# Patient Record
Sex: FEMALE | Race: WHITE | NOT HISPANIC OR LATINO | Employment: UNEMPLOYED | URBAN - METROPOLITAN AREA
[De-identification: names, ages, dates, MRNs, and addresses within clinical notes are randomized per-mention and may not be internally consistent; named-entity substitution may affect disease eponyms.]

---

## 2017-03-17 ENCOUNTER — OFFICE VISIT (OUTPATIENT)
Dept: ENDOCRINOLOGY | Age: 32
End: 2017-03-17

## 2017-03-17 VITALS
DIASTOLIC BLOOD PRESSURE: 72 MMHG | HEIGHT: 69 IN | BODY MASS INDEX: 19.99 KG/M2 | OXYGEN SATURATION: 98 % | SYSTOLIC BLOOD PRESSURE: 112 MMHG | WEIGHT: 135 LBS | HEART RATE: 97 BPM

## 2017-03-17 DIAGNOSIS — E05.00 GRAVES DISEASE: Primary | ICD-10-CM

## 2017-03-17 DIAGNOSIS — L70.9 ACNE, UNSPECIFIED ACNE TYPE: ICD-10-CM

## 2017-03-17 DIAGNOSIS — L68.0 HIRSUTISM: ICD-10-CM

## 2017-03-17 DIAGNOSIS — R53.82 CHRONIC FATIGUE: ICD-10-CM

## 2017-03-17 DIAGNOSIS — E28.2 PCOS (POLYCYSTIC OVARIAN SYNDROME): ICD-10-CM

## 2017-03-17 PROCEDURE — 99214 OFFICE O/P EST MOD 30 MIN: CPT | Performed by: INTERNAL MEDICINE

## 2017-03-17 RX ORDER — ALPRAZOLAM 0.5 MG/1
TABLET ORAL
COMMUNITY
Start: 2017-02-27

## 2017-03-17 RX ORDER — GABAPENTIN 300 MG/1
CAPSULE ORAL
COMMUNITY
Start: 2016-01-07 | End: 2017-11-09

## 2017-03-17 RX ORDER — HYDROCODONE BITARTRATE AND ACETAMINOPHEN 7.5; 325 MG/1; MG/1
TABLET ORAL
COMMUNITY
Start: 2016-01-07 | End: 2017-11-09

## 2017-03-17 RX ORDER — DEXTROAMPHETAMINE SULFATE, DEXTROAMPHETAMINE SACCHARATE, AMPHETAMINE SULFATE AND AMPHETAMINE ASPARTATE 6.25; 6.25; 6.25; 6.25 MG/1; MG/1; MG/1; MG/1
CAPSULE, EXTENDED RELEASE ORAL
COMMUNITY
Start: 2017-01-04 | End: 2017-11-09 | Stop reason: DRUGHIGH

## 2017-03-17 RX ORDER — FLUCONAZOLE 150 MG/1
TABLET ORAL
COMMUNITY
Start: 2017-02-26 | End: 2017-11-09

## 2017-03-17 NOTE — PROGRESS NOTES
31 y.o.    Patient Care Team:  Jennifer Ledesma MD as PCP - General  Jennifer Ledesma MD as PCP - Family Medicine    Chief Complaint:        F/U GRAVES' DISEASE.  NO RECENT LABS.  Subjective     HPI   Patient is a 31-year-old white female with a past history of Graves' disease and hyperthyroidism came for followup. After one year.  Patient missed a few appointments.  Hyperthyroidism. And Graves' disease.  Patient was previously evaluated by endocrinologist, but never needed medication. So far  Patient is currently not on medication.  She reports symptoms of fatigue, excessive somnolence, rash or the upper chest and face, palpitations, worsening acne, facial hair issues, blurring of vision for the past 6 months.  PCO S.  Patient reported that she used to be on spironolactone in the past but apparently did not tolerate it. So it was discontinued.  Menorrhagia.  Patient also reports excessive bleeding with clots and irregular bleeding for the past several months. She reports nausea and dizziness      Interval History     Summary  Hyperthyroidism and Graves' disease  Patient reports that she was diagnosed with Graves' disease approximately 6 years ago when her primary care physician is evaluating her for hypoglycemic episodes. He subsequently sent her to Dr. Piper who did most of the testing including a radioiodine scan and confirmed that she has Graves' disease but apparently patient was not recommended any medication at that time. She has not been on any specific medication since then.     Patient reports symptoms of joint pains, extreme fatigue, insomnia, heat intolerance, sweating but no change in weight. She also reports swelling of her hands frequently.  Patient also reports loose stools for the past few years.     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 Apparently she has seen a  neurosurgeon and pain management specialist and is currently on pain medication including Norco, gabapentin, Xanax etc.     Blurring of vision  Patient also reports significant blurring of vision especially on maintaining any focus for any length of time. She denies water from the eyes. She denies double vision or pain in the eyeball. She is extremely afraid of graves ophthalmopathy     Patient denied any family history of thyroid disorders.  She currently sees a gynecologist or a nurse practitioner and periodically receives Estrogen or testosterone products, she last used the pills approximately one month ago        The following portions of the patient's history were reviewed and updated as appropriate: allergies, current medications, past family history, past medical history, past social history, past surgical history and problem list.    No past medical history on file.  No family history on file.  Social History     Social History   • Marital status:      Spouse name: N/A   • Number of children: N/A   • Years of education: N/A     Occupational History   • Not on file.     Social History Main Topics   • Smoking status: Not on file   • Smokeless tobacco: Not on file   • Alcohol use Not on file   • Drug use: Not on file   • Sexual activity: Not on file     Other Topics Concern   • Not on file     Social History Narrative     Allergies not on file  No current outpatient prescriptions on file.        Review of Systems   Constitutional: Positive for fatigue. Negative for chills and fever.   Cardiovascular: Negative for chest pain and palpitations.   Gastrointestinal: Positive for constipation. Negative for abdominal pain, diarrhea, nausea and vomiting.   Endocrine: Positive for cold intolerance and heat intolerance.   Skin: Positive for rash (CHEST AND FACE).   All other systems reviewed and are negative.      Objective       Vitals:    03/17/17 0948   BP: 112/72   Pulse: 97   SpO2: 98%   Weight: 135 lb (61.2  "kg)   Height: 69\" (175.3 cm)     Body mass index is 19.94 kg/(m^2).      Physical Exam   Constitutional: She is oriented to person, place, and time.   HENT:   Head: Normocephalic and atraumatic.   Eyes: EOM are normal. Pupils are equal, round, and reactive to light.   Neck: Normal range of motion. Neck supple. No thyromegaly present.   Cardiovascular: Regular rhythm, normal heart sounds and intact distal pulses.    Pulmonary/Chest: Effort normal and breath sounds normal.   Abdominal: Soft. Bowel sounds are normal. She exhibits no distension. There is no tenderness.   Musculoskeletal: Normal range of motion. She exhibits no edema.   Neurological: She is alert and oriented to person, place, and time.   Skin: Skin is warm and dry.   Psychiatric: She has a normal mood and affect. Her behavior is normal.   Nursing note and vitals reviewed.    Results Review:     I reviewed the patient's new clinical results.    Medical records reviewed  Summary:      Conversion Encounter on 01/07/2016   Component Date Value Ref Range Status   • Free T4 01/07/2016 1.39  0.82 - 1.77 ng/dL Final   • TSH 01/07/2016 0.512  0.450 - 4.500 uIU/mL Final   • 25 Hydroxy, Vitamin D 01/07/2016 38.5  30.0 - 100.0 ng/mL Final    Comment: Vitamin D deficiency has been defined by the [place] [employer] and an Endocrine Society practice guideline as a  level of serum 25-OH vitamin D less than 20 ng/mL (1,2).  The Endocrine Society went on to further define vitamin D  insufficiency as a level between 21 and 29 ng/mL (2).  1. IOM ([place] [employer]). 2010. Dietary reference     intakes for calcium and D. [place]: The     National Academies Press.  2. Marisol MF, Belinda NC, Odalis MORELOS, et al.     Evaluation, treatment, and prevention of vitamin D     deficiency: an Endocrine Society clinical practice     guideline. JCEM. 2011 Jul; 96(7):1911-30.     • Thyroid Stimulating Immunoglobulin 01/07/2016 42  0 - 139 % Final   • T3, Total " 01/07/2016 106  71 - 180 ng/dL Final   • Vitamin B-12 01/07/2016 302  211 - 946 pg/mL Final   • Thyroid Peroxidase Antibody 01/07/2016 7  0 - 34 IU/mL Final     No results found for: HGBA1C  No results found for: GLUF, MICROALBUR, LDLCALC, CREATININE  Imaging Results (most recent)     None                Assessment and Plan:    Abigail was seen today for graves' disease.    Diagnoses and all orders for this visit:    Graves disease    Acne, unspecified acne type  -     Comprehensive Metabolic Panel  -     T3  -     T4, Free  -     TSH  -     Thyroid Peroxidase Antibody  -     Thyroid Stimulating Immunoglobulin    Hirsutism  -     Comprehensive Metabolic Panel  -     T3  -     T4, Free  -     TSH  -     Thyroid Peroxidase Antibody  -     Thyroid Stimulating Immunoglobulin    PCOS (polycystic ovarian syndrome)  -     Comprehensive Metabolic Panel  -     T3  -     T4, Free  -     TSH  -     Thyroid Peroxidase Antibody  -     Thyroid Stimulating Immunoglobulin    Chronic fatigue    Other orders  -     metFORMIN (GLUCOPHAGE) 500 MG tablet; Take 1 tablet by mouth 2 (Two) Times a Day With Meals.      Patient had history of Graves' disease but the last check was completely normal approximately one year ago  Patient continues to complain of symptoms suggestive of hyperthyroid state  She will get lab testing done today  She will try metformin  250 mg starting dose and adjust as tolerated for her PCO S and acne and hirsutism  Patient reported that she has tried spironolactone but had a lot of side effects in the past  Patient will get lab testing done today  She'll be notified of the lab results    Thyroid ultrasound reviewed with the patient  Mildly enlarged goiter no nodules noted  If indicating I may ask her to get radioactive iodine uptake and scan this time    Patient will return to follow-up in 6 months  The total time spent for old record and lab review and face- to- face was more than 25 min of which greater than 50% of  time was spent on counseling the patient on recommended evaluation and treatment options, instructions for management/treatment and /or follow up  and importance of compliance with chosen management or treatment options    Maynor Michaels MD. FACE    03/17/17      EMR Dragon / transcription disclaimer:    Much of this encounter note is an electronic transcription/ translation of spoken language to printed text.  Electronic translation of spoken language may permit erroneous, or at times, nonsensical words or phrases to be inadvertently transcribed; although I have reviewed the note for such errors, some may still exist.

## 2017-03-23 LAB
ALBUMIN SERPL-MCNC: 4.7 G/DL (ref 3.5–5.2)
ALBUMIN/GLOB SERPL: 1.9 G/DL
ALP SERPL-CCNC: 35 U/L (ref 39–117)
ALT SERPL-CCNC: 14 U/L (ref 1–33)
AST SERPL-CCNC: 18 U/L (ref 1–32)
BILIRUB SERPL-MCNC: 0.5 MG/DL (ref 0.1–1.2)
BUN SERPL-MCNC: 10 MG/DL (ref 6–20)
BUN/CREAT SERPL: 13.2 (ref 7–25)
CALCIUM SERPL-MCNC: 9.7 MG/DL (ref 8.6–10.5)
CHLORIDE SERPL-SCNC: 101 MMOL/L (ref 98–107)
CO2 SERPL-SCNC: 26.6 MMOL/L (ref 22–29)
CREAT SERPL-MCNC: 0.76 MG/DL (ref 0.57–1)
GLOBULIN SER CALC-MCNC: 2.5 GM/DL
GLUCOSE SERPL-MCNC: 103 MG/DL (ref 65–99)
POTASSIUM SERPL-SCNC: 4.9 MMOL/L (ref 3.5–5.2)
PROT SERPL-MCNC: 7.2 G/DL (ref 6–8.5)
SODIUM SERPL-SCNC: 141 MMOL/L (ref 136–145)
T3 SERPL-MCNC: 112.5 NG/DL (ref 80–200)
T4 FREE SERPL-MCNC: 1.18 NG/DL (ref 0.93–1.7)
THYROPEROXIDASE AB SERPL-ACNC: 8 IU/ML (ref 0–34)
TSH SERPL DL<=0.005 MIU/L-ACNC: 0.94 MIU/ML (ref 0.27–4.2)
TSI ACT/NOR SER: 30 % (ref 0–139)

## 2017-08-31 DIAGNOSIS — E05.00 GRAVES DISEASE: Primary | ICD-10-CM

## 2017-08-31 DIAGNOSIS — R53.82 CHRONIC FATIGUE: ICD-10-CM

## 2017-09-05 ENCOUNTER — RESULTS ENCOUNTER (OUTPATIENT)
Dept: ENDOCRINOLOGY | Age: 32
End: 2017-09-05

## 2017-09-05 DIAGNOSIS — R53.82 CHRONIC FATIGUE: ICD-10-CM

## 2017-09-05 DIAGNOSIS — E05.00 GRAVES DISEASE: ICD-10-CM

## 2017-11-09 ENCOUNTER — OFFICE VISIT (OUTPATIENT)
Dept: ENDOCRINOLOGY | Age: 32
End: 2017-11-09

## 2017-11-09 VITALS
BODY MASS INDEX: 19.76 KG/M2 | HEIGHT: 69 IN | HEART RATE: 86 BPM | DIASTOLIC BLOOD PRESSURE: 82 MMHG | SYSTOLIC BLOOD PRESSURE: 122 MMHG | WEIGHT: 133.4 LBS

## 2017-11-09 DIAGNOSIS — E16.1 HYPERINSULINISM: ICD-10-CM

## 2017-11-09 DIAGNOSIS — E05.00 GRAVES DISEASE: Primary | ICD-10-CM

## 2017-11-09 DIAGNOSIS — L70.9 ACNE, UNSPECIFIED ACNE TYPE: ICD-10-CM

## 2017-11-09 DIAGNOSIS — L68.0 HIRSUTISM: ICD-10-CM

## 2017-11-09 DIAGNOSIS — E28.2 PCOS (POLYCYSTIC OVARIAN SYNDROME): ICD-10-CM

## 2017-11-09 DIAGNOSIS — R53.82 CHRONIC FATIGUE: ICD-10-CM

## 2017-11-09 PROCEDURE — 99214 OFFICE O/P EST MOD 30 MIN: CPT | Performed by: INTERNAL MEDICINE

## 2017-11-09 RX ORDER — DEXTROAMPHETAMINE SULFATE, DEXTROAMPHETAMINE SACCHARATE, AMPHETAMINE SULFATE AND AMPHETAMINE ASPARTATE 7.5; 7.5; 7.5; 7.5 MG/1; MG/1; MG/1; MG/1
CAPSULE, EXTENDED RELEASE ORAL
COMMUNITY
Start: 2017-10-20

## 2017-11-09 NOTE — PROGRESS NOTES
32 y.o.    Patient Care Team:  Jennifer Ledesma MD as PCP - General  Jennifer Ledesma MD as PCP - Family Medicine    Chief Complaint:      F/U GRAVES' DISEASE.  NO RECENT LABS.  Subjective     HPI   Patient is a 31-year-old white female with a past history of Graves' disease and hyperthyroidism came for follow-up  Hypothyroidism  Patient was previously evaluated by endocrinologist but was never needed medication.  She's currently not taking any medication for the hyperthyroidism status  Graves' disease  Patient was diagnosed with Graves' disease  She denies any rash.  No pretibial myxedema and  No diplopia or blurring of vision.  Fatigue  Patient reports constant fatigue over the past several years.  She also has symptoms of palpitations and worsening acne, facial hair issues  PCO S  Patient is currently on hormone replacement from her gynecologist/practitioner.  Patient's last radioactive iodine uptake and scan was nearly 8 years ago  Patient also reports symptoms of hypoglycemia intermittently.    Menorrhagia.  Patient also reports excessive bleeding with clots and irregular bleeding for the past several months. She reports nausea and dizziness        Interval History     Summary  Hyperthyroidism and Graves' disease  Patient reports that she was diagnosed with Graves' disease approximately 6 years ago when her primary care physician is evaluating her for hypoglycemic episodes. He subsequently sent her to Dr. Piper who did most of the testing including a radioiodine scan and confirmed that she has Graves' disease but apparently patient was not recommended any medication at that time. She has not been on any specific medication since then.      Patient reports symptoms of joint pains, extreme fatigue, insomnia, heat intolerance, sweating but no change in weight. She also reports swelling of her hands frequently.  Patient also reports loose stools for the past few years.      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 Apparently she has seen a neurosurgeon and pain management specialist and is currently on pain medication including Norco, gabapentin, Xanax etc.      Blurring of vision  Patient also reports significant blurring of vision especially on maintaining any focus for any length of time. She denies water from the eyes. She denies double vision or pain in the eyeball. She is extremely afraid of graves ophthalmopathy      Patient denied any family history of thyroid disorders.  She currently sees a gynecologist or a nurse practitioner and periodically receives Estrogen or testosterone products, she last used the pills approximately one month ago  The following portions of the patient's history were reviewed and updated as appropriate: allergies, current medications, past family history, past medical history, past social history, past surgical history and problem list.    Past Medical History:   Diagnosis Date   • Graves disease    • PCOS (polycystic ovarian syndrome)      Family History   Problem Relation Age of Onset   • Hypertension Mother    • Diabetes Mother    • Hyperlipidemia Mother    • COPD Father    • Peripheral vascular disease Father    • Diabetes Father    • Hypertension Father    • Hyperlipidemia Father      Social History     Social History   • Marital status:      Spouse name: N/A   • Number of children: N/A   • Years of education: N/A     Occupational History   • Not on file.     Social History Main Topics   • Smoking status: Never Smoker   • Smokeless tobacco: Never Used   • Alcohol use No   • Drug use: No   • Sexual activity: Not on file     Other Topics Concern   • Not on file     Social History Narrative     Allergies   Allergen Reactions   • Epinephrine    • Lidocaine      Lidocaine with epi  Lidocaine with epi  Lidocaine with epi       Current Outpatient Prescriptions:  "  •  ADDERALL XR 25 MG 24 hr capsule, , Disp: , Rfl:   •  ALPRAZolam (XANAX) 0.5 MG tablet, , Disp: , Rfl:   •  fluconazole (DIFLUCAN) 150 MG tablet, , Disp: , Rfl:   •  gabapentin (NEURONTIN) 300 MG capsule, Take  by mouth., Disp: , Rfl:   •  HYDROcodone-acetaminophen (NORCO) 7.5-325 MG per tablet, Take  by mouth., Disp: , Rfl:   •  metFORMIN (GLUCOPHAGE) 500 MG tablet, Take 1 tablet by mouth 2 (Two) Times a Day With Meals., Disp: 60 tablet, Rfl: 3        Review of Systems   Constitutional: Positive for fever. Negative for chills and fatigue.   Cardiovascular: Negative for chest pain and palpitations.   Gastrointestinal: Positive for diarrhea. Negative for abdominal pain and constipation.   Endocrine: Negative for cold intolerance and heat intolerance.   All other systems reviewed and are negative.      Objective       Vitals:    11/09/17 1316   BP: 122/82   Pulse: 86   Weight: 133 lb 6.4 oz (60.5 kg)   Height: 69\" (175.3 cm)     Body mass index is 19.7 kg/(m^2).      Physical Exam   Constitutional: She is oriented to person, place, and time.   HENT:   Head: Normocephalic and atraumatic.   Eyes: EOM are normal. Pupils are equal, round, and reactive to light.   Neck: Normal range of motion. Neck supple. No thyromegaly present.   Cardiovascular: Regular rhythm, normal heart sounds and intact distal pulses.    Pulmonary/Chest: Effort normal and breath sounds normal.   Abdominal: Soft. Bowel sounds are normal. She exhibits no distension. There is no tenderness.   Musculoskeletal: Normal range of motion. She exhibits no edema.   Neurological: She is alert and oriented to person, place, and time.   Skin: Skin is warm and dry.   Psychiatric: She has a normal mood and affect. Her behavior is normal.   Nursing note and vitals reviewed.    Results Review:     I reviewed the patient's new clinical results.    Medical records reviewed  Summary:      Office Visit on 03/17/2017   Component Date Value Ref Range Status   • Glucose " 03/17/2017 103* 65 - 99 mg/dL Final   • BUN 03/17/2017 10  6 - 20 mg/dL Final   • Creatinine 03/17/2017 0.76  0.57 - 1.00 mg/dL Final   • eGFR Non  Am 03/17/2017 88  >60 mL/min/1.73 Final   • eGFR African Am 03/17/2017 107  >60 mL/min/1.73 Final   • BUN/Creatinine Ratio 03/17/2017 13.2  7.0 - 25.0 Final   • Sodium 03/17/2017 141  136 - 145 mmol/L Final   • Potassium 03/17/2017 4.9  3.5 - 5.2 mmol/L Final   • Chloride 03/17/2017 101  98 - 107 mmol/L Final   • Total CO2 03/17/2017 26.6  22.0 - 29.0 mmol/L Final   • Calcium 03/17/2017 9.7  8.6 - 10.5 mg/dL Final   • Total Protein 03/17/2017 7.2  6.0 - 8.5 g/dL Final   • Albumin 03/17/2017 4.70  3.50 - 5.20 g/dL Final   • Globulin 03/17/2017 2.5  gm/dL Final   • A/G Ratio 03/17/2017 1.9  g/dL Final   • Total Bilirubin 03/17/2017 0.5  0.1 - 1.2 mg/dL Final   • Alkaline Phosphatase 03/17/2017 35* 39 - 117 U/L Final   • AST (SGOT) 03/17/2017 18  1 - 32 U/L Final   • ALT (SGPT) 03/17/2017 14  1 - 33 U/L Final   • T3, Total 03/17/2017 112.5  80.0 - 200.0 ng/dL Final   • Free T4 03/17/2017 1.18  0.93 - 1.70 ng/dL Final   • TSH 03/17/2017 0.939  0.270 - 4.200 mIU/mL Final   • Thyroid Peroxidase Antibody 03/17/2017 8  0 - 34 IU/mL Final   • Thyroid Stimulating Immunoglobulin 03/17/2017 30  0 - 139 % Final     No results found for: HGBA1C  Lab Results   Component Value Date    CREATININE 0.76 03/17/2017     Imaging Results (most recent)     None                Assessment and Plan:    Abigail was seen today for graves' disease.    Diagnoses and all orders for this visit:    Graves disease  -     TSH  -     T4, Free  -     Follicle Stimulating Hormone  -     Luteinizing Hormone  -     Vitamin D 25 Hydroxy  -     Vitamin B12    Hirsutism  -     TSH  -     T4, Free  -     Follicle Stimulating Hormone  -     Luteinizing Hormone  -     Vitamin D 25 Hydroxy  -     Vitamin B12    Acne, unspecified acne type  -     TSH  -     T4, Free  -     Follicle Stimulating Hormone  -      "Luteinizing Hormone  -     Vitamin D 25 Hydroxy  -     Vitamin B12    PCOS (polycystic ovarian syndrome)  -     TSH  -     T4, Free  -     Follicle Stimulating Hormone  -     Luteinizing Hormone  -     Vitamin D 25 Hydroxy  -     Vitamin B12    Chronic fatigue  -     TSH  -     T4, Free  -     Follicle Stimulating Hormone  -     Luteinizing Hormone  -     Vitamin D 25 Hydroxy  -     Vitamin B12    Hyperinsulinism      Patient reported that she was unable to take metformin due to stomach upsets and had discontinued  She still has significant symptoms suggestive of vasomotor instability  She had tried spironolactone in the past but also had side effects  Patient may benefit from tilt table testing to explain the various symptoms that she has  Patient had a thyroid ultrasound but no nodules were noted  Patient's last TSH is 1.2 in [facility]    Patient will get lab testing done today  Patient will return to follow-up in one year    The total time spent for old record and lab review and face- to- face was more than 25 min of which greater than 15 min of time was spent on counseling the patient on recommended evaluation and treatment options, instructions for management/treatment and /or follow up  and importance of compliance with chosen management or treatment options     Maynor Michaels MD. FACE    11/09/17      EMR Dragon / transcription disclaimer:     \"Dictated utilizing Dragon dictation\".         "

## 2017-11-10 LAB
25(OH)D3+25(OH)D2 SERPL-MCNC: 38 NG/ML (ref 30–100)
FSH SERPL-ACNC: 9.8 MIU/ML
LH SERPL-ACNC: 10.1 MIU/ML
T4 FREE SERPL-MCNC: 1.24 NG/DL (ref 0.93–1.7)
TSH SERPL DL<=0.005 MIU/L-ACNC: 1.39 MIU/ML (ref 0.27–4.2)
VIT B12 SERPL-MCNC: 355 PG/ML (ref 211–946)

## 2018-11-02 ENCOUNTER — RESULTS ENCOUNTER (OUTPATIENT)
Dept: ENDOCRINOLOGY | Age: 33
End: 2018-11-02

## 2018-11-02 DIAGNOSIS — E05.00 GRAVES DISEASE: Primary | ICD-10-CM

## 2018-11-02 DIAGNOSIS — E28.2 PCOS (POLYCYSTIC OVARIAN SYNDROME): ICD-10-CM

## 2018-11-02 DIAGNOSIS — E05.00 GRAVES DISEASE: ICD-10-CM

## 2021-04-16 ENCOUNTER — BULK ORDERING (OUTPATIENT)
Dept: CASE MANAGEMENT | Facility: OTHER | Age: 36
End: 2021-04-16

## 2021-04-16 DIAGNOSIS — Z23 IMMUNIZATION DUE: ICD-10-CM
